# Patient Record
Sex: FEMALE | Race: OTHER | HISPANIC OR LATINO | URBAN - METROPOLITAN AREA
[De-identification: names, ages, dates, MRNs, and addresses within clinical notes are randomized per-mention and may not be internally consistent; named-entity substitution may affect disease eponyms.]

---
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
## 2023-03-06 NOTE — ED ADULT NURSE NOTE - ED CARDIAC HEART SOUNDS
Left message for patient reminding him of his EGD with Dr Sobia Casas at [facility] 3/20  He will need a , be called Friday before with arrival time and the only prep is noting to eat after midnight and may have clear liquids up to 4 hours prior  normal S1, S2 heard

## 2024-05-15 NOTE — ED ADULT NURSE NOTE - SUICIDE SCREENING QUESTION 3
No
ORT = 10. Medical optimization pending. Surgical team to follow. Pain consult ordered for DOS. PCP for medical management and follow up as indicated.